# Patient Record
Sex: MALE | Race: WHITE | NOT HISPANIC OR LATINO | URBAN - METROPOLITAN AREA
[De-identification: names, ages, dates, MRNs, and addresses within clinical notes are randomized per-mention and may not be internally consistent; named-entity substitution may affect disease eponyms.]

---

## 2018-01-11 ENCOUNTER — HOSPITAL ENCOUNTER (EMERGENCY)
Facility: OTHER | Age: 23
Discharge: HOME | End: 2018-01-11
Attending: EMERGENCY MEDICINE
Payer: MEDICAID

## 2018-01-11 VITALS
WEIGHT: 180 LBS | SYSTOLIC BLOOD PRESSURE: 134 MMHG | HEIGHT: 69 IN | TEMPERATURE: 99 F | OXYGEN SATURATION: 100 % | BODY MASS INDEX: 26.66 KG/M2 | DIASTOLIC BLOOD PRESSURE: 83 MMHG | RESPIRATION RATE: 16 BRPM | HEART RATE: 109 BPM

## 2018-01-11 DIAGNOSIS — L03.211 CELLULITIS OF FACE: Primary | ICD-10-CM

## 2018-01-11 PROCEDURE — 25000003 PHARM REV CODE 250: Performed by: PHYSICIAN ASSISTANT

## 2018-01-11 PROCEDURE — 99283 EMERGENCY DEPT VISIT LOW MDM: CPT

## 2018-01-11 RX ORDER — CLINDAMYCIN HYDROCHLORIDE 150 MG/1
450 CAPSULE ORAL EVERY 6 HOURS
Qty: 120 CAPSULE | Refills: 0 | Status: SHIPPED | OUTPATIENT
Start: 2018-01-11 | End: 2018-01-21

## 2018-01-11 RX ORDER — CLINDAMYCIN HYDROCHLORIDE 150 MG/1
450 CAPSULE ORAL
Status: COMPLETED | OUTPATIENT
Start: 2018-01-11 | End: 2018-01-11

## 2018-01-11 RX ADMIN — CLINDAMYCIN HYDROCHLORIDE 450 MG: 150 CAPSULE ORAL at 07:01

## 2018-01-12 NOTE — ED TRIAGE NOTES
Pt c/o nose swelling on Monday night. Pt states his nose is tender and painful to touch. Pt states he has been around his daughter who has strep throat. Pt denying nasal congestion or cough at this time. Pt is aaox4 and appears to be nad

## 2018-01-12 NOTE — ED PROVIDER NOTES
"Encounter Date: 1/11/2018       History     Chief Complaint   Patient presents with    Facial Swelling     "The top of my nose is hurting. I called the nurse on call and she said I could have an infection to come to the ER now". Denies fever or chills. No swelling or redness noted. no resp distress reported     22-year-old male with history of osteosarcoma presents emergency department with complaints of no swelling, redness and pain.  He states his symptoms been present for a few days.  He denies fever or chills, trauma or injury.  He does report some associated congestion.  He admits that his daughter was recently diagnosed and treated for strep throat.  He states that he talk to his primary care physician's clinic who recommended he come to the emergency department "right away" to rule out possible cellulitis.  He complains of pain is a 2 out of 10.  No current treatment at this time.      The history is provided by the patient and the spouse.     Review of patient's allergies indicates:   Allergen Reactions    Cefprozil     Promethazine     Sulfa (sulfonamide antibiotics)     Zithromax [azithromycin]      Past Medical History:   Diagnosis Date    Cancer      Past Surgical History:   Procedure Laterality Date    LEG SURGERY      lengthening     History reviewed. No pertinent family history.  Social History   Substance Use Topics    Smoking status: Never Smoker    Smokeless tobacco: Not on file    Alcohol use No     Review of Systems   Constitutional: Negative for chills and fever.   HENT: Positive for congestion and facial swelling. Negative for postnasal drip, sinus pressure and sore throat.    Respiratory: Negative for shortness of breath.    Cardiovascular: Negative for chest pain.   Gastrointestinal: Negative for nausea and vomiting.   Genitourinary: Negative for dysuria.   Musculoskeletal: Negative for back pain.   Skin: Negative for rash.   Neurological: Negative for weakness.   Hematological: " Does not bruise/bleed easily.       Physical Exam     Initial Vitals [01/11/18 1830]   BP Pulse Resp Temp SpO2   133/69 103 16 98.5 °F (36.9 °C) 98 %      MAP       90.33         Physical Exam    Nursing note and vitals reviewed.  Constitutional: He appears well-developed and well-nourished. He is not diaphoretic.  Non-toxic appearance. No distress.   HENT:   Head: Normocephalic and atraumatic.   Right Ear: Tympanic membrane, external ear and ear canal normal. No middle ear effusion.   Left Ear: Tympanic membrane, external ear and ear canal normal.  No middle ear effusion.   Nose: Mucosal edema present. No rhinorrhea, nose lacerations, nasal deformity, septal deviation or nasal septal hematoma. No epistaxis.  No foreign bodies.   Mouth/Throat: Uvula is midline, oropharynx is clear and moist and mucous membranes are normal. No trismus in the jaw. No uvula swelling. No oropharyngeal exudate.   Subtle edema to the nasal bridge with questionable mild erythema and warmth.  No significant tenderness palpation on exam.  Patient does have nasal mucosal edema without obvious infection/abscess   Eyes: Conjunctivae, EOM and lids are normal. Pupils are equal, round, and reactive to light. No scleral icterus.   Neck: Normal range of motion and phonation normal. Neck supple.   Abdominal: Normal appearance.   Musculoskeletal: Normal range of motion.   No obvious deformities, moving all extremities, normal gait   Neurological: He is alert and oriented to person, place, and time. He has normal strength. No sensory deficit.   Skin: Skin is warm, dry and intact. No abrasion, no bruising, no ecchymosis, no laceration, no lesion, no rash and no abscess noted.   Psychiatric: He has a normal mood and affect. His speech is normal and behavior is normal. Judgment normal. Cognition and memory are normal.         ED Course   Procedures  Labs Reviewed - No data to display          Medical Decision Making:   History:   I obtained history from:  someone other than patient.       <> Summary of History: spouse  Old Medical Records: I decided to obtain old medical records.  Initial Assessment:   22-year-old male with complaints consistent with cellulitis of the nose.  Afebrile and neurovascularly intact.  Alert, healthy and nontoxic appearing.  In no apparent distress.  No focal neurological deficits.  Exam documented above.  No obvious abscess.  Concerning for skin infection.  ED Management:  Concerns for possible cellulitis.  Will start on clindamycin as he has multiple medication ALLERGIES.  Administered first dose here and discharged home with prescription for clindamycin and care instructions.  He is stable at time of discharge.  This patient was discussed with the attending physician who also evaluated him and agrees with treatment plan.  Other:   I have discussed this case with another health care provider.       <> Summary of the Discussion: Smita  This note was created using Dragon Medical dictation.  There may be typographical errors secondary to dictation.                     ED Course      Clinical Impression:     1. Cellulitis of face          Disposition:   Disposition: Discharged  Condition: Stable                        Alberta Alicia PA-C  01/11/18 1943